# Patient Record
Sex: FEMALE | URBAN - METROPOLITAN AREA
[De-identification: names, ages, dates, MRNs, and addresses within clinical notes are randomized per-mention and may not be internally consistent; named-entity substitution may affect disease eponyms.]

---

## 2021-04-20 ENCOUNTER — EMERGENCY (EMERGENCY)
Facility: HOSPITAL | Age: 21
LOS: 1 days | Discharge: HOME | End: 2021-04-20
Admitting: EMERGENCY MEDICINE
Payer: COMMERCIAL

## 2021-04-20 VITALS
HEART RATE: 100 BPM | TEMPERATURE: 97 F | DIASTOLIC BLOOD PRESSURE: 79 MMHG | RESPIRATION RATE: 16 BRPM | OXYGEN SATURATION: 98 % | HEIGHT: 63 IN | WEIGHT: 110.01 LBS | SYSTOLIC BLOOD PRESSURE: 111 MMHG
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
## 2021-04-20 DIAGNOSIS — R10.2 PELVIC AND PERINEAL PAIN: ICD-10-CM

## 2021-04-20 DIAGNOSIS — T19.2XXA FOREIGN BODY IN VULVA AND VAGINA, INITIAL ENCOUNTER: ICD-10-CM
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
## 2021-04-20 PROCEDURE — 99284 EMERGENCY DEPT VISIT MOD MDM: CPT

## 2021-04-20 NOTE — ED ADULT NURSE NOTE - CHPI ED NUR SYMPTOMS NEG
no abdominal pain/no back pain/no coffee grounds emesis/no discharge/no fever/no nausea/no vaginal discharge/no vomiting

## 2021-04-20 NOTE — ED PROVIDER NOTE - OBJECTIVE STATEMENT
21 year old female with no PMHx presents with pelvic pain s/p having tampon stuck in canal. patient reports she had sex earlier today and forgot it was in there. she could feel it with her fingers at home, but could not pull it out.  Denies fever, chills, N/V/D/C, melena, hematochezia, hematuria, change in urinary/bowel function, dysuria, d/c, flank pain, HA, dizziness, focal weakness, CP, SOB, palpitations, cough, and malaise.

## 2021-04-20 NOTE — ED PROVIDER NOTE - NSFOLLOWUPINSTRUCTIONS_ED_ALL_ED_FT
1)  PLEASE FOLLOW-UP WITH YOUR PRIMARY CARE DOCTOR OR REFERRED SPECIALIST IN 1-2 DAYS.     2)  BRING ALL PAPERWORK FROM TODAY'S EMERGENCY ROOM  VISIT TO YOUR FOLLOW-UP VISIT.  IF YOU DO NOT HAVE A PRIMARY CARE DOCTOR PLEASE REFER TO THE OFFICE/CLINIC INFORMATION GIVEN ABOVE.  YOU MAY ALSO CALL [phone] AND ASK FOR MS. DONTA SOLORZANO.  SHE CAN HELP YOU MAKE A FOLLOW-UP APPOINTMENT.  HER HOURS ARE 11AM-7PM MONDAY - FRIDAY.    3) PLEASE RETURN TO THE ER IMMEDIATELY OR CALL 911 FOR ANY HIGH FEVER, TROUBLE BREATHING, CHEST PAIN, WEAKNESS, VOMITING, SEVERE PAIN, OR ANY OTHER CONCERNS.

## 2021-04-20 NOTE — ED PROVIDER NOTE - PATIENT PORTAL LINK FT
You can access the FollowMyHealth Patient Portal offered by [facility] by registering at the following website: [email or web address]. By joining [facility]’s FollowMyHealth portal, you will also be able to view your health information using other applications (apps) compatible with our system.

## 2021-04-20 NOTE — ED ADULT TRIAGE NOTE - CHIEF COMPLAINT QUOTE
nolan felder, pt endorsed forgetting tampon was in and realized after sex -felt discomfort after intercourse immediately after intercourse

## 2021-04-20 NOTE — ED PROVIDER NOTE - PHYSICAL EXAMINATION
VITAL SIGNS: I have reviewed nursing notes and confirm.  CONSTITUTIONAL: Well-developed; well-nourished; in no acute distress.  SKIN: Skin is warm and dry, no acute rash.  HEAD: Normocephalic; atraumatic.  EYES: PERRL, EOM intact; conjunctiva and sclera clear.  ENT: No nasal discharge; airway clear.  NECK: Supple; non tender.  CARD: S1, S2 normal; no murmurs, gallops, or rubs. Regular rate and rhythm.  RESP: No wheezes, rales or rhonchi.  ABD: Normal bowel sounds; soft; non-distended; non-tender;  no palpable or pulsating mass; no hepatosplenomegaly.  : +vaginal FB, tampon removed. bleeding noted due to menses, no retained FB, normal external genitalia.   EXT: Normal ROM. No clubbing, cyanosis or edema.  NEURO: Alert, oriented. Grossly unremarkable.  PSYCH: Cooperative, appropriate.

## 2021-04-20 NOTE — ED PROVIDER NOTE - NS ED ROS FT
· CONSTITUTIONAL: no fever and no chills.  · CARDIOVASCULAR: normal rate and rhythm, no chest pain and no edema.  · RESPIRATORY: no chest pain, no cough, and no shortness of breath.  · GASTROINTESTINAL: no abdominal pain, no bloating, no constipation, no diarrhea, no nausea and no vomiting.  - : +vaginal FB  · MUSCULOSKELETAL: no back pain, no musculoskeletal pain, no neck pain, and no weakness.  · SKIN: no abrasions, no jaundice, no lesions, no pruritis, and no rashes.  · NEURO: no loss of consciousness, no gait abnormality, no headache, no sensory deficits, and no weakness.  · PSYCHIATRIC: no known mental health issues.

## 2023-09-11 NOTE — ED ADULT TRIAGE NOTE - WEIGHT IN KG
Transitional Care Management Telephone Call    Today's Date: 9/11/2023      Discharge Date: 9/6/23    Discharged From: [facility]    Items for attention: 9/6 - Repair of left foot second, third, fourth and fifth angular toe deformities. No changes to the bandage until seen by provider in 9 days she states. Signs of infection reviewed. Odor, drainage, pain, fever, swelling. She denies any signs of infection or any symptoms to report.    Care Transitions Assessment    Utilization:  Visit Hospital Last 30 Days: Planned inpatient admission   Perception of Change in Health Status D/C: Improving  Discharged To: Home independently  Discharge Instructions: Received discharge instructions  Transition of Care Information Provided:    Phone Call to Facility to Check Status: Follow-up scheduled    Medications:  Prescriptions:    IP/Amb Med List Reviewed with Patient:    Med Prescriptions Filled After D/C: Already has supply  Questions About Meds Prescribed at D/C:      Follow-up Care:  Appointments: Ortho  Provider Appt Scheduled Prior to D/C: Yes  Provider Appt Date: 09/19/23     Type of Provider:       Appt Scheduled Prior to D/C:    Specialist Appt Date:    Type of Specialist:      Follow-up Appt Status:      Skilled Services:    Skilled Services Options:      Equipment/DME:   DME Type:    Oxygen Vendor:    Oxygen Comments:    Patients reported confidence in appropriate use of DME?      Review of Systems:   Care Transition System Evaluation: 9/6 - Repair of left foot second, third, fourth and fifth angular toe deformities. No changes to the bandage until seen by provider in 9 days she states. Signs of infection reviewed. Odor, drainage, pain, fever, swelling. She denies any signs of infection or any symptoms to report.  BP- Patient Reported:     Pulse - Patient Reported :     Temp - Patient Reported :    Fever: is not present   Pain:  3  Pain Location: L foot  Weight-Patient Reported :    Resp - Patient  Reported :    SpO2% - Patient Reported :   Peakflow - Patient Reported :    General Symptoms: WDL (absence of symptoms)  Neurologic/Neuromuscular Symptoms: WDL (absence of symptoms)  ENT Symptoms: WDL (absence of symptoms)  Respiratory Symptoms: WDL (absence of symptoms)     Cardiovascular Symptoms: WDL (absence of symptoms)  GI Symptoms: WDL (absence of symptoms)         Symptoms: WDL (absence of symptoms)  Skin Symptoms: WDL (absence of symptoms)  Wound Type: Surgical Incision (L foot)    Hours of Uninterrupted Sleep:   Sleeping Behaviors:WDL (absence of symptoms)  Current Lines/Drains:No    Activities of Daily Living (global): Self-care    Patient states that she does have sufficient family support. She feels that she is able to ask for assistance when needed.      49.9